# Patient Record
Sex: MALE | Race: WHITE
[De-identification: names, ages, dates, MRNs, and addresses within clinical notes are randomized per-mention and may not be internally consistent; named-entity substitution may affect disease eponyms.]

---

## 2017-06-02 NOTE — NUR
22 Y/O M W/C/O SORETHROAT AND HEADACHES X 2-3 DAYS. PT DENIES ANY FEVER OR 
CHILLS. TONSILS APPEAR SWOLLEN. NO S/S OF RESP DISTRESS NOTED. ER MD MADE 
AWARE.

## 2017-06-02 NOTE — NUR
Patient discharged with v/s stable. Written and verbal after care instructions 
given and explained. Patient alert, oriented and verbalized understanding of 
instructions. Ambulatory with steady gait. All questions addressed prior to 
discharge. ID band removed. Patient advised to follow up with PMD THIS WK OR 
RETURN TO ER IF CONDITION WORSENS. Rx of TYLENOL WITH CODEINE PT ADVISED NOT TO 
TAKE TYLENOL UNTIL 10:00 AM D/T TYLENOL JUST ADMINISTERED AT 0400 AM TODAY. 
ALSO AN RX FOR  NAPROSYN, AND AMOXICILLIN  given. Patient educated on 
indication of medication including possible reaction and side effects. 
Opportunity to ask questions provided and answered.

## 2019-12-01 ENCOUNTER — HOSPITAL ENCOUNTER (EMERGENCY)
Dept: HOSPITAL 26 - MED | Age: 24
Discharge: HOME | End: 2019-12-01
Payer: MEDICAID

## 2019-12-01 VITALS — HEIGHT: 74 IN | WEIGHT: 170 LBS | BODY MASS INDEX: 21.82 KG/M2

## 2019-12-01 VITALS — SYSTOLIC BLOOD PRESSURE: 125 MMHG | DIASTOLIC BLOOD PRESSURE: 83 MMHG

## 2019-12-01 DIAGNOSIS — A64: Primary | ICD-10-CM

## 2019-12-01 DIAGNOSIS — Z88.8: ICD-10-CM

## 2019-12-01 DIAGNOSIS — Z79.899: ICD-10-CM

## 2019-12-01 LAB
APPEARANCE UR: (no result)
BILIRUB UR QL STRIP: NEGATIVE
COLOR UR: YELLOW
GLUCOSE UR STRIP-MCNC: NEGATIVE MG/DL
HGB UR QL STRIP: (no result)
LEUKOCYTE ESTERASE UR QL STRIP: (no result)
NITRITE UR QL STRIP: NEGATIVE
PH UR STRIP: 6 [PH] (ref 5–9)
RBC #/AREA URNS HPF: (no result) /HPF (ref 0–5)
WBC,URINE: (no result) /HPF (ref 0–5)

## 2019-12-01 PROCEDURE — 81001 URINALYSIS AUTO W/SCOPE: CPT

## 2019-12-01 PROCEDURE — 36415 COLL VENOUS BLD VENIPUNCTURE: CPT

## 2019-12-01 PROCEDURE — 96372 THER/PROPH/DIAG INJ SC/IM: CPT

## 2019-12-01 PROCEDURE — 99283 EMERGENCY DEPT VISIT LOW MDM: CPT

## 2019-12-01 PROCEDURE — 87086 URINE CULTURE/COLONY COUNT: CPT

## 2019-12-01 NOTE — NUR
PT TO ED WITH C/O DYSURIA, WHITE PENILE DISCHARGE, AND REDNESS AROUND PENIS S/P 
UNPROTECTED SEX X 3 DAYS AGO. DENIES MULTIPLE PARTNERS. IN BED FOR MD ALF[facility].

## 2019-12-04 NOTE — NUR
Called patient regarding positive lab results for gonnorhea & chamydia. Per Dr. Castro, the antibiotics pt received while in ER 12/1/19 will suffice and no 
other medication is needed. Pt is aware.

## 2022-05-28 ENCOUNTER — HOSPITAL ENCOUNTER (EMERGENCY)
Dept: HOSPITAL 87 - ER | Age: 27
LOS: 1 days | Discharge: HOME | End: 2022-05-29
Payer: MEDICAID

## 2022-05-28 VITALS — BODY MASS INDEX: 28.32 KG/M2 | WEIGHT: 220.68 LBS | HEIGHT: 74 IN

## 2022-05-28 DIAGNOSIS — R51.9: ICD-10-CM

## 2022-05-28 DIAGNOSIS — B34.9: Primary | ICD-10-CM

## 2022-05-28 DIAGNOSIS — R50.9: ICD-10-CM

## 2022-05-28 DIAGNOSIS — J02.9: ICD-10-CM

## 2022-05-28 DIAGNOSIS — Z20.822: ICD-10-CM

## 2022-05-28 PROCEDURE — 87430 STREP A AG IA: CPT

## 2022-05-28 PROCEDURE — 87426 SARSCOV CORONAVIRUS AG IA: CPT

## 2022-05-28 PROCEDURE — C9803 HOPD COVID-19 SPEC COLLECT: HCPCS

## 2022-05-28 PROCEDURE — 99283 EMERGENCY DEPT VISIT LOW MDM: CPT

## 2022-05-28 PROCEDURE — 87804 INFLUENZA ASSAY W/OPTIC: CPT

## 2022-05-28 PROCEDURE — 87070 CULTURE OTHR SPECIMN AEROBIC: CPT

## 2022-05-29 VITALS — SYSTOLIC BLOOD PRESSURE: 135 MMHG | DIASTOLIC BLOOD PRESSURE: 79 MMHG
